# Patient Record
Sex: FEMALE | Race: WHITE | Employment: UNEMPLOYED | URBAN - NONMETROPOLITAN AREA
[De-identification: names, ages, dates, MRNs, and addresses within clinical notes are randomized per-mention and may not be internally consistent; named-entity substitution may affect disease eponyms.]

---

## 2020-11-05 ENCOUNTER — OFFICE VISIT (OUTPATIENT)
Dept: OBGYN CLINIC | Age: 25
End: 2020-11-05
Payer: MEDICARE

## 2020-11-05 VITALS
HEIGHT: 63 IN | DIASTOLIC BLOOD PRESSURE: 60 MMHG | BODY MASS INDEX: 40.22 KG/M2 | WEIGHT: 227 LBS | SYSTOLIC BLOOD PRESSURE: 108 MMHG

## 2020-11-05 PROCEDURE — 99213 OFFICE O/P EST LOW 20 MIN: CPT | Performed by: ADVANCED PRACTICE MIDWIFE

## 2020-11-05 RX ORDER — LAMOTRIGINE 150 MG/1
TABLET ORAL
COMMUNITY
Start: 2020-09-16

## 2020-11-05 RX ORDER — ESCITALOPRAM OXALATE 20 MG/1
TABLET ORAL
COMMUNITY
Start: 2020-09-21

## 2020-11-05 ASSESSMENT — ENCOUNTER SYMPTOMS: GASTROINTESTINAL NEGATIVE: 1

## 2020-11-05 NOTE — PROGRESS NOTES
PROBLEM VISIT     Date of service: 2020    Maria L Child  Is a 31 y.o.  female    PT's PCP is: No primary care provider on file. : 1995                                          Review of Systems   Constitutional: Negative. HENT: Negative. Gastrointestinal: Negative. Genitourinary: Positive for menstrual problem (Cycles every 28-34 days. TTC). Musculoskeletal: Negative. Neurological: Negative. Psychiatric/Behavioral:        Following closely with Dr Sarah Martinez         Patient's last menstrual period was 2020. OB History    Para Term  AB Living   4 3 2 1 1 3   SAB TAB Ectopic Molar Multiple Live Births   1       1 3      # Outcome Date GA Lbr Alen/2nd Weight Sex Delivery Anes PTL Lv   4A SAB 10/25/18 16w0d   M    ND      Birth Comments: D/C   4B SAB 10/25/18 16w0d   M    ND      Birth Comments: D/C   3 Term 10/19/15 37w0d   F CS-LTranv   FRANKIE      Complications: Pre-eclampsia   2  13 36w0d   M Vag-Spont   FRANKIE   1 Term 12 39w0d   M Vag-Spont EPI  FRANKIE        Social History     Tobacco Use   Smoking Status Never Smoker   Smokeless Tobacco Never Used        Social History     Substance and Sexual Activity   Alcohol Use Yes    Comment: rare       Allergies: Patient has no known allergies.       Current Outpatient Medications:     Prenatal Vit-Fe Fumarate-FA (PRENATAL VITAMIN PO), Take 1 tablet by mouth daily, Disp: , Rfl:     escitalopram (LEXAPRO) 20 MG tablet, TAKE 1 TABLET BY MOUTH ONCE DAILY, Disp: , Rfl:     lamoTRIgine (LAMICTAL) 150 MG tablet, TAKE 1 TABLET BY MOUTH ONCE DAILY, Disp: , Rfl:     metFORMIN (GLUCOPHAGE) 500 MG tablet, TAKE 1 TABLET BY MOUTH ONCE DAILY FOR 7 DAYS THEN 2 ONCE DAILY FOR 7 DAYS THEN 3 ONCE DAILY, Disp: , Rfl:     Social History     Substance and Sexual Activity   Sexual Activity Yes       Last Yearly:  3/2020    Last pap: 3/2020    Last HPV: reflex testing    Chief Complaint   Patient presents with   Linda Blevins

## 2020-11-11 ENCOUNTER — TELEPHONE (OUTPATIENT)
Dept: OBGYN CLINIC | Age: 25
End: 2020-11-11

## 2020-11-11 NOTE — TELEPHONE ENCOUNTER
Spoke to patient to remind her of ultrasound on 11/12/20 at 8:45, followed by appointment with Davis Quinones at 9:45.

## 2020-11-12 ENCOUNTER — OFFICE VISIT (OUTPATIENT)
Dept: OBGYN CLINIC | Age: 25
End: 2020-11-12
Payer: MEDICARE

## 2020-11-12 VITALS — WEIGHT: 226.8 LBS | DIASTOLIC BLOOD PRESSURE: 73 MMHG | SYSTOLIC BLOOD PRESSURE: 120 MMHG | BODY MASS INDEX: 40.18 KG/M2

## 2020-11-12 PROCEDURE — 99213 OFFICE O/P EST LOW 20 MIN: CPT | Performed by: NURSE PRACTITIONER

## 2020-11-12 NOTE — PROGRESS NOTES
PROBLEM VISIT     Date of service: 2020    Batool Tabor  Is a 31 y.o. female    PT's PCP is: No primary care provider on file. : 1995                                             Subjective:       Patient's last menstrual period was 2020. OB History    Para Term  AB Living   4 3 2 1 1 3   SAB TAB Ectopic Molar Multiple Live Births   1       1 3      # Outcome Date GA Lbr Alen/2nd Weight Sex Delivery Anes PTL Lv   4A SAB 10/25/18 16w0d   M    ND      Birth Comments: D/C   4B SAB 10/25/18 16w0d   M    ND      Birth Comments: D/C   3 Term 10/19/15 37w0d   F CS-LTranv   FRANKIE      Complications: Pre-eclampsia   2  13 36w0d   M Vag-Spont   FRANKIE   1 Term 12 39w0d   M Vag-Spont EPI  FRANKIE        Social History     Tobacco Use   Smoking Status Never Smoker   Smokeless Tobacco Never Used        Social History     Substance and Sexual Activity   Alcohol Use Yes    Comment: rare       Allergies: Patient has no known allergies. Current Outpatient Medications:     Prenatal Vit-Fe Fumarate-FA (PRENATAL VITAMIN PO), Take 1 tablet by mouth daily, Disp: , Rfl:     escitalopram (LEXAPRO) 20 MG tablet, TAKE 1 TABLET BY MOUTH ONCE DAILY, Disp: , Rfl:     lamoTRIgine (LAMICTAL) 150 MG tablet, TAKE 1 TABLET BY MOUTH ONCE DAILY, Disp: , Rfl:     metFORMIN (GLUCOPHAGE) 500 MG tablet, TAKE 1 TABLET BY MOUTH ONCE DAILY FOR 7 DAYS THEN 2 ONCE DAILY FOR 7 DAYS THEN 3 ONCE DAILY, Disp: , Rfl:     Social History     Substance and Sexual Activity   Sexual Activity Yes       Chief Complaint   Patient presents with    Follow-up     Follow up follicle scan        PE:  Vital Signs  Blood pressure 120/73, weight 226 lb 12.8 oz (102.9 kg), last menstrual period 2020. HPI: Patient here following mid cycle follicle scan. PT denies fever, chills, nausea and vomiting       Review of Systems   Constitutional: Negative. Genitourinary: Negative.         Physical Exam  Constitutional:       Appearance: Normal appearance. HENT:      Head: Normocephalic. Pulmonary:      Effort: Pulmonary effort is normal.   Musculoskeletal: Normal range of motion. Neurological:      Mental Status: She is alert. Psychiatric:         Mood and Affect: Mood normal.         Behavior: Behavior normal.         Assessment and Plan          Diagnosis Orders   1. Infertility associated with anovulation     2. Metabolic syndrome         Patient here following midcycle follicle scan, no dominant follicle this cycle. Patient planning for day 21 progesterone level. Patient is aware to call day 1 of cycle for Rx       I am having Balaji Allen maintain her Prenatal Vit-Fe Fumarate-FA (PRENATAL VITAMIN PO), escitalopram, lamoTRIgine, and metFORMIN. There are no Patient Instructions on file for this visit. Over 50% of time spent on counseling and care coordination on: see assessment and plan,  She was also counseled on her preventative health maintenance recommendations and follow-up.         FF time: 15 min      Jimmy Sands,11/12/2020 9:24 AM

## 2020-12-03 RX ORDER — LETROZOLE 2.5 MG/1
2.5 TABLET, FILM COATED ORAL DAILY
Qty: 5 TABLET | Refills: 0 | Status: SHIPPED | OUTPATIENT
Start: 2020-12-03

## 2025-07-18 ENCOUNTER — HOSPITAL ENCOUNTER (INPATIENT)
Age: 30
LOS: 4 days | Discharge: HOME | DRG: 751 | End: 2025-07-22
Attending: PSYCHIATRY & NEUROLOGY | Admitting: PSYCHIATRY & NEUROLOGY
Payer: MEDICAID

## 2025-07-18 PROBLEM — F33.2 SEVERE RECURRENT MAJOR DEPRESSION WITHOUT PSYCHOTIC FEATURES (HCC): Status: ACTIVE | Noted: 2025-07-18

## 2025-07-18 PROBLEM — F32.2 MDD (MAJOR DEPRESSIVE DISORDER), SINGLE EPISODE, SEVERE , NO PSYCHOSIS (HCC): Status: ACTIVE | Noted: 2025-07-18

## 2025-07-18 PROCEDURE — 1240000000 HC EMOTIONAL WELLNESS R&B

## 2025-07-18 PROCEDURE — 6370000000 HC RX 637 (ALT 250 FOR IP): Performed by: PSYCHIATRY & NEUROLOGY

## 2025-07-18 PROCEDURE — 6370000000 HC RX 637 (ALT 250 FOR IP): Performed by: PHYSICIAN ASSISTANT

## 2025-07-18 RX ORDER — DULAGLUTIDE 3 MG/.5ML
3 INJECTION, SOLUTION SUBCUTANEOUS WEEKLY
COMMUNITY
Start: 2025-07-08

## 2025-07-18 RX ORDER — VITAMIN B COMPLEX
5000 TABLET ORAL DAILY
Status: DISCONTINUED | OUTPATIENT
Start: 2025-07-18 | End: 2025-07-18

## 2025-07-18 RX ORDER — PRENATAL WITH FERROUS FUM AND FOLIC ACID 3080; 920; 120; 400; 22; 1.84; 3; 20; 10; 1; 12; 200; 27; 25; 2 [IU]/1; [IU]/1; MG/1; [IU]/1; MG/1; MG/1; MG/1; MG/1; MG/1; MG/1; UG/1; MG/1; MG/1; MG/1; MG/1
1 TABLET ORAL DAILY
Status: DISCONTINUED | OUTPATIENT
Start: 2025-07-18 | End: 2025-07-18

## 2025-07-18 RX ORDER — VENLAFAXINE HYDROCHLORIDE 37.5 MG/1
37.5 CAPSULE, EXTENDED RELEASE ORAL DAILY
Status: ON HOLD | COMMUNITY
Start: 2025-07-08 | End: 2025-07-21 | Stop reason: HOSPADM

## 2025-07-18 RX ORDER — HYDROXYZINE HYDROCHLORIDE 25 MG/1
50 TABLET, FILM COATED ORAL 3 TIMES DAILY PRN
Status: DISCONTINUED | OUTPATIENT
Start: 2025-07-18 | End: 2025-07-22 | Stop reason: HOSPADM

## 2025-07-18 RX ORDER — MESALAMINE 1.2 G/1
2.4 TABLET, DELAYED RELEASE ORAL EVERY EVENING
Status: DISCONTINUED | OUTPATIENT
Start: 2025-07-18 | End: 2025-07-22 | Stop reason: HOSPADM

## 2025-07-18 RX ORDER — MESALAMINE 0.38 G/1
4 CAPSULE, EXTENDED RELEASE ORAL NIGHTLY
Status: DISCONTINUED | OUTPATIENT
Start: 2025-07-18 | End: 2025-07-18 | Stop reason: SDUPTHER

## 2025-07-18 RX ORDER — VITAMIN B COMPLEX
5000 TABLET ORAL NIGHTLY
Status: DISCONTINUED | OUTPATIENT
Start: 2025-07-18 | End: 2025-07-22 | Stop reason: HOSPADM

## 2025-07-18 RX ORDER — PRENATAL WITH FERROUS FUM AND FOLIC ACID 3080; 920; 120; 400; 22; 1.84; 3; 20; 10; 1; 12; 200; 27; 25; 2 [IU]/1; [IU]/1; MG/1; [IU]/1; MG/1; MG/1; MG/1; MG/1; MG/1; MG/1; UG/1; MG/1; MG/1; MG/1; MG/1
1 TABLET ORAL NIGHTLY
Status: DISCONTINUED | OUTPATIENT
Start: 2025-07-18 | End: 2025-07-22 | Stop reason: HOSPADM

## 2025-07-18 RX ORDER — VENLAFAXINE HYDROCHLORIDE 150 MG/1
150 CAPSULE, EXTENDED RELEASE ORAL NIGHTLY
Status: DISCONTINUED | OUTPATIENT
Start: 2025-07-18 | End: 2025-07-22 | Stop reason: HOSPADM

## 2025-07-18 RX ORDER — MESALAMINE 0.38 G/1
4 CAPSULE, EXTENDED RELEASE ORAL DAILY
COMMUNITY
Start: 2025-07-03

## 2025-07-18 RX ORDER — TRAZODONE HYDROCHLORIDE 50 MG/1
50 TABLET ORAL NIGHTLY PRN
Status: DISCONTINUED | OUTPATIENT
Start: 2025-07-18 | End: 2025-07-22 | Stop reason: HOSPADM

## 2025-07-18 RX ORDER — DM/PE/ACETAMINOPHEN/CHLORPHENR 10-5-325-2
5000 TABLET, SEQUENTIAL ORAL DAILY
COMMUNITY
Start: 2025-07-03

## 2025-07-18 RX ORDER — PROPRANOLOL HCL 20 MG
20 TABLET ORAL 3 TIMES DAILY
Status: DISCONTINUED | OUTPATIENT
Start: 2025-07-18 | End: 2025-07-21

## 2025-07-18 RX ORDER — IBUPROFEN 400 MG/1
400 TABLET, FILM COATED ORAL EVERY 6 HOURS PRN
Status: DISCONTINUED | OUTPATIENT
Start: 2025-07-18 | End: 2025-07-21

## 2025-07-18 RX ORDER — ACETAMINOPHEN 325 MG/1
650 TABLET ORAL EVERY 4 HOURS PRN
Status: DISCONTINUED | OUTPATIENT
Start: 2025-07-18 | End: 2025-07-22 | Stop reason: HOSPADM

## 2025-07-18 RX ORDER — PANTOPRAZOLE SODIUM 40 MG/1
40 TABLET, DELAYED RELEASE ORAL
Status: DISCONTINUED | OUTPATIENT
Start: 2025-07-18 | End: 2025-07-22 | Stop reason: HOSPADM

## 2025-07-18 RX ORDER — HYDROXYZINE HYDROCHLORIDE 25 MG/1
25 TABLET, FILM COATED ORAL EVERY 6 HOURS PRN
Status: ON HOLD | COMMUNITY
Start: 2025-07-17 | End: 2025-07-21 | Stop reason: HOSPADM

## 2025-07-18 RX ORDER — OMEPRAZOLE 40 MG/1
40 CAPSULE, DELAYED RELEASE ORAL DAILY
COMMUNITY
Start: 2025-07-15

## 2025-07-18 RX ORDER — MAGNESIUM HYDROXIDE/ALUMINUM HYDROXICE/SIMETHICONE 120; 1200; 1200 MG/30ML; MG/30ML; MG/30ML
30 SUSPENSION ORAL EVERY 6 HOURS PRN
Status: DISCONTINUED | OUTPATIENT
Start: 2025-07-18 | End: 2025-07-22 | Stop reason: HOSPADM

## 2025-07-18 RX ORDER — METOPROLOL SUCCINATE 50 MG/1
50 TABLET, EXTENDED RELEASE ORAL NIGHTLY
Status: ON HOLD | COMMUNITY
Start: 2025-07-03 | End: 2025-07-21 | Stop reason: HOSPADM

## 2025-07-18 RX ORDER — VENLAFAXINE HYDROCHLORIDE 75 MG/1
75 CAPSULE, EXTENDED RELEASE ORAL DAILY
Status: ON HOLD | COMMUNITY
Start: 2025-07-03 | End: 2025-07-21 | Stop reason: HOSPADM

## 2025-07-18 RX ADMIN — PRENATAL WITH FERROUS FUM AND FOLIC ACID 1 TABLET: 3080; 920; 120; 400; 22; 1.84; 3; 20; 10; 1; 12; 200; 27; 25; 2 TABLET ORAL at 20:59

## 2025-07-18 RX ADMIN — MESALAMINE 2.4 G: 1.2 TABLET, DELAYED RELEASE ORAL at 21:00

## 2025-07-18 RX ADMIN — PROPRANOLOL HYDROCHLORIDE 20 MG: 20 TABLET ORAL at 20:59

## 2025-07-18 RX ADMIN — Medication 5000 UNITS: at 20:59

## 2025-07-18 RX ADMIN — Medication 3 MG: at 21:04

## 2025-07-18 RX ADMIN — PROPRANOLOL HYDROCHLORIDE 20 MG: 20 TABLET ORAL at 13:40

## 2025-07-18 RX ADMIN — HYDROXYZINE HYDROCHLORIDE 50 MG: 25 TABLET, FILM COATED ORAL at 11:59

## 2025-07-18 RX ADMIN — VENLAFAXINE HYDROCHLORIDE 150 MG: 150 CAPSULE, EXTENDED RELEASE ORAL at 20:59

## 2025-07-18 ASSESSMENT — PAIN - FUNCTIONAL ASSESSMENT: PAIN_FUNCTIONAL_ASSESSMENT: ACTIVITIES ARE NOT PREVENTED

## 2025-07-18 ASSESSMENT — PAIN SCALES - GENERAL
PAINLEVEL_OUTOF10: 2
PAINLEVEL_OUTOF10: 0

## 2025-07-18 ASSESSMENT — PAIN DESCRIPTION - DESCRIPTORS: DESCRIPTORS: ACHING

## 2025-07-18 ASSESSMENT — LIFESTYLE VARIABLES
HOW OFTEN DO YOU HAVE A DRINK CONTAINING ALCOHOL: PATIENT DECLINED
HOW MANY STANDARD DRINKS CONTAINING ALCOHOL DO YOU HAVE ON A TYPICAL DAY: PATIENT DECLINED

## 2025-07-18 ASSESSMENT — PAIN DESCRIPTION - LOCATION: LOCATION: OTHER (COMMENT)

## 2025-07-18 ASSESSMENT — PAIN DESCRIPTION - ONSET: ONSET: GRADUAL

## 2025-07-18 ASSESSMENT — PAIN DESCRIPTION - PAIN TYPE: TYPE: ACUTE PAIN

## 2025-07-18 ASSESSMENT — PAIN DESCRIPTION - FREQUENCY: FREQUENCY: INTERMITTENT

## 2025-07-18 NOTE — BH NOTE
INPATIENT RECREATIONAL THERAPY  ADULT BEHAVIORAL SERVICES  EVALUATION    REFERRING PHYSICIAN:  Carol Ann Garvey MD  DIAGNOSIS: MDD, single episode, severe, no psychosis   PRECAUTIONS:  standard  HISTORY OF PRESENT ILLNESS/INJURY:   a 30 y.o. female with significant past psychiatric history of depression and anxiety admitted from [facility] for worsening suicidal ideation and a plan to kill self.   PMH:  Please see medical chart for prior medical history, allergies, and medicatio    HISTORY OF PSYCHIATRIC TREATMENT:  Currently follows with - Dr Dowling- [facility]   multiple lifetime suicide attempts  multiple psychiatric hospital admissions  YOB: 1995  GENDER:  female  MARITAL STATUS:     EMPLOYMENT STATUS:   employed  LIVING SITUATION/SUPPORT:    EDUCATIONAL LEVEL:   MEDICATION/DRUG USE:    LEISURE INTERESTS:  coloring/watching movies/complete [employer] puzzles/jennifer  ACTIVITY PREFERENCE: no preference  ACTIVITY TYPES:  indoor/outdoor/active/passive  COGNITION: A&xO4      COPING: fair  ATTENTION: fair  RELAXATION: poor  SELF-ESTEEM: poor  MOTIVATION:  poor     SOCIAL SKILLS:  poor  FRUSTRATION TOLERANCE:  no documented hx of violent or aggressive behavior   ATTENTION SEEKING: no attention seeking behaviors observed at this time  COOPERATION: pt. Was pleasant and agreeable to the TR assessment   AFFECT: flat  APPEARANCE: pt. Displays fair grooming and hygiene and wears hospital scrubs     HEARING:  WNL  VISION:  WNL   VERBAL COMMUNICATION:  no impairment noted at this time  WRITTEN COMMUNICATION:  no impairment noted at this time     COORDINATION: no impairment noted at this time   MOBILITY: pt. Is able to ambulate independently on the unit            GOALS: .to increase socialization and knowledge of coping skills through participation in group therapy sessions following verbal encouragement from staff.

## 2025-07-18 NOTE — H&P
Department of Psychiatry  Attending Physician Psychiatric Assessment     Reason for Admission to Psychiatric Unit:  Threat to self requiring 24 hour professional observation  Concerns about patient's safety in the community    CHIEF COMPLAINT:  Suicidal ideation with an intent    History obtained from:  patient, electronic medical record and family members    HISTORY OF PRESENT ILLNESS:    Paulina Hauser is a 30 y.o. female with significant past psychiatric history of depression and anxiety admitted from [facility] for worsening suicidal ideation and a plan to kill self.  Patient reports that she has been feeling recently depressed for last several months now.  Identifies significant anxiety and panic attacks as a primary stressor.  Mentions that she was recently discharged from [facility] and [facility]  Mentions that she had an appointment with her outpatient psychiatrist following which she had a significant panic attack and felt like she would not get any better following which she came back to the hospital.  Mentions that she fell by overdosing and dying following which she was recommended admission to psychiatric hospital.  Continues to report feeling helpless and hopeless about her anxiety.  Reports trouble falling asleep and staying asleep reports very poor attention and concentration.  Appears somewhat and flat and withdrawn during the conversation today.  No concerns for any significant drug use.  Also identifies stressors as very poor support from any friends or family members.  Discussed with her about resuming Effexor and titrating to effect along with switching metoprolol to propranolol to help with her anxiety.  Patient voiced understanding.      PSYCHIATRIC HISTORY:  yes Depression, anxiety    Currently follows with - Dr Dowling- [facility]   multiple lifetime suicide attempts  multiple psychiatric hospital admissions    [facility]

## 2025-07-18 NOTE — PATIENT CARE CONFERENCE
Behavioral Health  Initial Interdisciplinary Treatment Plan NOTE    REVIEW DATE AND TIME: 7/18/2025 1320    PATIENT was IN TREATMENT TEAM.  See Multidisciplinary Treatment Team sheet for participants.    ADMISSION TYPE:        REASON FOR ADMISSION:         Estimated Length of Stay Update:  3 to 5 days  Estimated Discharge Date Update: 7/20/2025       Addictive Behavior:   Medical Problems:  Past Medical History:   Diagnosis Date    Anemia     Anxiety     Bipolar affective (HCC)     Depression     Hypertension     Insulin resistance     Obese     Polycystic ovaries     Seasonal allergies     Yeast infection        EDUCATION:   Learner Progress Toward Treatment Goals: Reviewed results and recommendations of this team, Reviewed group plan and strategies, Reviewed signs, symptoms and risk of self harm and violent behavior, and Reviewed goals and plan of care    Method: Small group    Outcome: Verbalized understanding and Demonstrated Understanding    PATIENT GOALS: \"get my anxiety under control\"    OQ PRIORITIES IDENTIFIED BY THE PATIENT ON ADMISSION: (These are the symptoms that the patient has identified that are impacting them the most at the time of admission based on their own input on the OQ.  These priorities are to be included in all of their care while admitted.)        Not completed at time of this clinician's assessment    PLAN/TREATMENT RECOMMENDATIONS UPDATE:   What is the most important thing we can help you with while you are here? See Above  Who is your support system? Friend and dog  Do you have follow-up providers? [facility]: [facility], [facility], and a   Do you have the ability to pay for your medications? Yes, [place] Medicaid  Where will you be residing when you leave the hospital? Pt is currently homeless in [place]  Will need a return to work slip or FMLA paper completion? denies      GOALS UPDATE:   Time frame for Short-Term Goals: Daily    BRAYAN May

## 2025-07-19 PROBLEM — F33.2 SEVERE EPISODE OF RECURRENT MAJOR DEPRESSIVE DISORDER, WITHOUT PSYCHOTIC FEATURES (HCC): Status: ACTIVE | Noted: 2025-07-19

## 2025-07-19 PROBLEM — F41.9 ANXIETY DISORDER: Status: ACTIVE | Noted: 2025-07-19

## 2025-07-19 PROCEDURE — APPSS30 APP SPLIT SHARED TIME 16-30 MINUTES: Performed by: NURSE PRACTITIONER

## 2025-07-19 PROCEDURE — 6370000000 HC RX 637 (ALT 250 FOR IP): Performed by: PHYSICIAN ASSISTANT

## 2025-07-19 PROCEDURE — 6370000000 HC RX 637 (ALT 250 FOR IP): Performed by: PSYCHIATRY & NEUROLOGY

## 2025-07-19 PROCEDURE — 1240000000 HC EMOTIONAL WELLNESS R&B

## 2025-07-19 RX ADMIN — HYDROXYZINE HYDROCHLORIDE 50 MG: 25 TABLET, FILM COATED ORAL at 08:00

## 2025-07-19 RX ADMIN — Medication 3 MG: at 21:20

## 2025-07-19 RX ADMIN — PROPRANOLOL HYDROCHLORIDE 20 MG: 20 TABLET ORAL at 08:00

## 2025-07-19 RX ADMIN — PANTOPRAZOLE SODIUM 40 MG: 40 TABLET, DELAYED RELEASE ORAL at 06:46

## 2025-07-19 RX ADMIN — VENLAFAXINE HYDROCHLORIDE 150 MG: 150 CAPSULE, EXTENDED RELEASE ORAL at 21:20

## 2025-07-19 RX ADMIN — HYDROXYZINE HYDROCHLORIDE 50 MG: 25 TABLET, FILM COATED ORAL at 17:31

## 2025-07-19 RX ADMIN — Medication 5000 UNITS: at 21:20

## 2025-07-19 RX ADMIN — MESALAMINE 2.4 G: 1.2 TABLET, DELAYED RELEASE ORAL at 17:26

## 2025-07-19 RX ADMIN — PROPRANOLOL HYDROCHLORIDE 20 MG: 20 TABLET ORAL at 21:20

## 2025-07-19 RX ADMIN — PROPRANOLOL HYDROCHLORIDE 20 MG: 20 TABLET ORAL at 14:00

## 2025-07-19 RX ADMIN — PRENATAL WITH FERROUS FUM AND FOLIC ACID 1 TABLET: 3080; 920; 120; 400; 22; 1.84; 3; 20; 10; 1; 12; 200; 27; 25; 2 TABLET ORAL at 21:20

## 2025-07-19 ASSESSMENT — PAIN DESCRIPTION - PAIN TYPE: TYPE: ACUTE PAIN

## 2025-07-19 ASSESSMENT — PAIN - FUNCTIONAL ASSESSMENT: PAIN_FUNCTIONAL_ASSESSMENT: ACTIVITIES ARE NOT PREVENTED

## 2025-07-19 ASSESSMENT — PAIN DESCRIPTION - DESCRIPTORS: DESCRIPTORS: ACHING

## 2025-07-19 ASSESSMENT — PAIN SCALES - GENERAL
PAINLEVEL_OUTOF10: 2
PAINLEVEL_OUTOF10: 0

## 2025-07-19 ASSESSMENT — PAIN DESCRIPTION - LOCATION: LOCATION: FOOT

## 2025-07-19 NOTE — BH NOTE
Goal Wrap-Up/Relaxation Group    Date: 7/18/2025  Start Time: 2000  End Time:  2020    Type of Group: Goal Wrap Up and Relaxation    States that goal today was: none    Goal for today was No goal set today    Patient Did not participate in group/activities      Signature: Lynn Pringle

## 2025-07-20 PROCEDURE — 6370000000 HC RX 637 (ALT 250 FOR IP): Performed by: PSYCHIATRY & NEUROLOGY

## 2025-07-20 PROCEDURE — 1240000000 HC EMOTIONAL WELLNESS R&B

## 2025-07-20 PROCEDURE — 6370000000 HC RX 637 (ALT 250 FOR IP): Performed by: PHYSICIAN ASSISTANT

## 2025-07-20 PROCEDURE — APPSS30 APP SPLIT SHARED TIME 16-30 MINUTES: Performed by: NURSE PRACTITIONER

## 2025-07-20 RX ADMIN — PROPRANOLOL HYDROCHLORIDE 20 MG: 20 TABLET ORAL at 21:37

## 2025-07-20 RX ADMIN — PRENATAL WITH FERROUS FUM AND FOLIC ACID 1 TABLET: 3080; 920; 120; 400; 22; 1.84; 3; 20; 10; 1; 12; 200; 27; 25; 2 TABLET ORAL at 21:37

## 2025-07-20 RX ADMIN — MESALAMINE 2.4 G: 1.2 TABLET, DELAYED RELEASE ORAL at 17:19

## 2025-07-20 RX ADMIN — Medication 5000 UNITS: at 21:37

## 2025-07-20 RX ADMIN — PROPRANOLOL HYDROCHLORIDE 20 MG: 20 TABLET ORAL at 14:29

## 2025-07-20 RX ADMIN — PANTOPRAZOLE SODIUM 40 MG: 40 TABLET, DELAYED RELEASE ORAL at 06:53

## 2025-07-20 RX ADMIN — IBUPROFEN 400 MG: 400 TABLET ORAL at 20:04

## 2025-07-20 RX ADMIN — PROPRANOLOL HYDROCHLORIDE 20 MG: 20 TABLET ORAL at 09:16

## 2025-07-20 RX ADMIN — HYDROXYZINE HYDROCHLORIDE 50 MG: 25 TABLET, FILM COATED ORAL at 18:08

## 2025-07-20 RX ADMIN — HYDROXYZINE HYDROCHLORIDE 50 MG: 25 TABLET, FILM COATED ORAL at 09:16

## 2025-07-20 RX ADMIN — Medication 3 MG: at 21:37

## 2025-07-20 ASSESSMENT — PAIN SCALES - GENERAL
PAINLEVEL_OUTOF10: 0
PAINLEVEL_OUTOF10: 3
PAINLEVEL_OUTOF10: 3

## 2025-07-20 ASSESSMENT — PAIN - FUNCTIONAL ASSESSMENT: PAIN_FUNCTIONAL_ASSESSMENT: PREVENTS OR INTERFERES SOME ACTIVE ACTIVITIES AND ADLS

## 2025-07-20 ASSESSMENT — PAIN DESCRIPTION - LOCATION: LOCATION: FOOT

## 2025-07-20 ASSESSMENT — PAIN DESCRIPTION - ORIENTATION: ORIENTATION: RIGHT

## 2025-07-20 ASSESSMENT — PAIN DESCRIPTION - DESCRIPTORS: DESCRIPTORS: ACHING

## 2025-07-20 NOTE — BH NOTE
Goal Wrap-Up/Relaxation Group    Date: 7/19/2025  Start Time: 2000  End Time:  2020    Type of Group: Relaxation      Patient Did not participate in group/activities      Signature: Netta Barraza RN

## 2025-07-20 NOTE — PATIENT CARE CONFERENCE
Behavioral Health   Day 3 Interdisciplinary Treatment Plan NOTE    Review Date & Time: 7/20/2025 8:38    Patient was in treatment team    Admission Type:   Admission Type: Involuntary    Reason for admission:  Reason for Admission: JUNIOR. Patient refused admission assessment due to \"sensory perception issues\".  Estimated Length of Stay Update:  1 to 2 days  Estimated Discharge Date Update: 7/24/2025       Addictive Behavior:   Medical Problems:  Past Medical History:   Diagnosis Date    Anemia     Anxiety     Bipolar affective (HCC)     Depression     Hypertension     Insulin resistance     Obese     Polycystic ovaries     Seasonal allergies     Yeast infection        Risk:  Fall Risk   Kaden Scale Kaden Scale Score: 21    Status EXAM:   Mental Status and Behavioral Exam  Normal: No  Level of Assistance: Independent/Self  Facial Expression: Flat  Affect: Blunt  Level of Consciousness: Alert  Frequency of Checks: 4 times per hour, close  Mood:Normal: No  Mood: Anxious  Motor Activity:Normal: No  Motor Activity: Decreased  Eye Contact: Poor  Observed Behavior: Withdrawn, Cooperative, Friendly  Sexual Misconduct History: Current - no  Preception: [place] to person, [place] to time, [place] to place, [place] to situation  Attention:Normal: No  Attention: Unable to concentrate  Thought Processes: Circumstantial  Thought Content:Normal: No  Thought Content: Paranoia, Ideas of influence  Depression Symptoms: No problems reported or observed.  Anxiety Symptoms: Generalized  Natalie Symptoms: No problems reported or observed.  Hallucinations: None  Delusions: No  Memory:Normal: No  Memory: Confabulation  Insight and Judgment: No  Insight and Judgment: Poor judgment, Poor insight, Unmotivated, Unrealistic    Daily Assessment Last Entry:   Daily Sleep (WDL): Within Defined Limits            Daily Nutrition (WDL): Within Defined Limits  Level of Assistance: Independent/Self    Patient Monitoring:  Frequency of Checks: 4 times per

## 2025-07-21 ENCOUNTER — APPOINTMENT (OUTPATIENT)
Dept: GENERAL RADIOLOGY | Age: 30
DRG: 751 | End: 2025-07-21
Attending: PSYCHIATRY & NEUROLOGY
Payer: MEDICAID

## 2025-07-21 PROBLEM — F32.2 MDD (MAJOR DEPRESSIVE DISORDER), SINGLE EPISODE, SEVERE , NO PSYCHOSIS (HCC): Status: RESOLVED | Noted: 2025-07-18 | Resolved: 2025-07-21

## 2025-07-21 PROCEDURE — 6370000000 HC RX 637 (ALT 250 FOR IP): Performed by: PSYCHIATRY & NEUROLOGY

## 2025-07-21 PROCEDURE — APPSS30 APP SPLIT SHARED TIME 16-30 MINUTES: Performed by: PHYSICIAN ASSISTANT

## 2025-07-21 PROCEDURE — 6370000000 HC RX 637 (ALT 250 FOR IP): Performed by: PHYSICIAN ASSISTANT

## 2025-07-21 PROCEDURE — 1240000000 HC EMOTIONAL WELLNESS R&B

## 2025-07-21 PROCEDURE — 73630 X-RAY EXAM OF FOOT: CPT

## 2025-07-21 RX ORDER — VENLAFAXINE HYDROCHLORIDE 150 MG/1
150 CAPSULE, EXTENDED RELEASE ORAL NIGHTLY
Qty: 30 CAPSULE | Refills: 0 | Status: SHIPPED | OUTPATIENT
Start: 2025-07-21 | End: 2025-07-22

## 2025-07-21 RX ORDER — PROPRANOLOL HYDROCHLORIDE 10 MG/1
30 TABLET ORAL 3 TIMES DAILY
Qty: 90 TABLET | Refills: 2 | Status: SHIPPED | OUTPATIENT
Start: 2025-07-21 | End: 2025-07-22

## 2025-07-21 RX ORDER — HYDROXYZINE HYDROCHLORIDE 50 MG/1
50 TABLET, FILM COATED ORAL 3 TIMES DAILY PRN
Qty: 60 TABLET | Refills: 0 | Status: SHIPPED | OUTPATIENT
Start: 2025-07-21 | End: 2025-07-22

## 2025-07-21 RX ORDER — IBUPROFEN 800 MG/1
800 TABLET, FILM COATED ORAL EVERY 8 HOURS PRN
Status: DISCONTINUED | OUTPATIENT
Start: 2025-07-21 | End: 2025-07-22 | Stop reason: HOSPADM

## 2025-07-21 RX ADMIN — PROPRANOLOL HYDROCHLORIDE 20 MG: 20 TABLET ORAL at 10:00

## 2025-07-21 RX ADMIN — PROPRANOLOL HYDROCHLORIDE 30 MG: 20 TABLET ORAL at 21:06

## 2025-07-21 RX ADMIN — PROPRANOLOL HYDROCHLORIDE 30 MG: 20 TABLET ORAL at 15:07

## 2025-07-21 RX ADMIN — MESALAMINE 2.4 G: 1.2 TABLET, DELAYED RELEASE ORAL at 21:05

## 2025-07-21 RX ADMIN — PANTOPRAZOLE SODIUM 40 MG: 40 TABLET, DELAYED RELEASE ORAL at 06:47

## 2025-07-21 RX ADMIN — PRENATAL WITH FERROUS FUM AND FOLIC ACID 1 TABLET: 3080; 920; 120; 400; 22; 1.84; 3; 20; 10; 1; 12; 200; 27; 25; 2 TABLET ORAL at 21:06

## 2025-07-21 RX ADMIN — IBUPROFEN 800 MG: 800 TABLET, FILM COATED ORAL at 21:07

## 2025-07-21 RX ADMIN — Medication 3 MG: at 21:09

## 2025-07-21 RX ADMIN — Medication 5000 UNITS: at 21:05

## 2025-07-21 RX ADMIN — VENLAFAXINE HYDROCHLORIDE 150 MG: 150 CAPSULE, EXTENDED RELEASE ORAL at 21:06

## 2025-07-21 ASSESSMENT — PAIN SCALES - GENERAL
PAINLEVEL_OUTOF10: 4
PAINLEVEL_OUTOF10: 4
PAINLEVEL_OUTOF10: 7

## 2025-07-21 ASSESSMENT — PAIN DESCRIPTION - LOCATION
LOCATION: FOOT
LOCATION: FOOT

## 2025-07-21 ASSESSMENT — PAIN DESCRIPTION - ORIENTATION: ORIENTATION: RIGHT

## 2025-07-21 ASSESSMENT — PAIN DESCRIPTION - FREQUENCY: FREQUENCY: INTERMITTENT

## 2025-07-21 ASSESSMENT — PAIN DESCRIPTION - DESCRIPTORS
DESCRIPTORS: CRAMPING;ACHING
DESCRIPTORS: ACHING

## 2025-07-21 ASSESSMENT — PAIN DESCRIPTION - PAIN TYPE: TYPE: ACUTE PAIN

## 2025-07-21 ASSESSMENT — PAIN - FUNCTIONAL ASSESSMENT: PAIN_FUNCTIONAL_ASSESSMENT: PREVENTS OR INTERFERES SOME ACTIVE ACTIVITIES AND ADLS

## 2025-07-21 NOTE — BH NOTE
Goal Wrap-Up/Relaxation Group    Date: 7/20/25  Start Time: 2000  End Time:  2020    Type of Group: Relaxation        Patient Did not participate in group/activities      Signature: Netta Barraza RN

## 2025-07-21 NOTE — GROUP NOTE
Group Therapy Note    Date: 7/21/2025    Group Start Time: 1330  Group End Time: 1400  Group Topic: Healthy Living/Wellness    [facility] Adult Psych 7E    Anitha Prater LPN        Group Therapy Note    Attendees: 10       Notes:  did not attend    Discipline Responsible: Licensed Practical Nurse      Signature:  Anitha Prater LPN

## 2025-07-22 VITALS
RESPIRATION RATE: 18 BRPM | OXYGEN SATURATION: 100 % | SYSTOLIC BLOOD PRESSURE: 114 MMHG | TEMPERATURE: 96.9 F | DIASTOLIC BLOOD PRESSURE: 79 MMHG | HEART RATE: 79 BPM

## 2025-07-22 PROCEDURE — 6370000000 HC RX 637 (ALT 250 FOR IP): Performed by: PSYCHIATRY & NEUROLOGY

## 2025-07-22 PROCEDURE — 6370000000 HC RX 637 (ALT 250 FOR IP): Performed by: PHYSICIAN ASSISTANT

## 2025-07-22 RX ORDER — PROPRANOLOL HYDROCHLORIDE 10 MG/1
30 TABLET ORAL 3 TIMES DAILY
Qty: 90 TABLET | Refills: 2 | Status: SHIPPED | OUTPATIENT
Start: 2025-07-22

## 2025-07-22 RX ORDER — VENLAFAXINE HYDROCHLORIDE 150 MG/1
150 CAPSULE, EXTENDED RELEASE ORAL NIGHTLY
Qty: 30 CAPSULE | Refills: 0 | Status: SHIPPED | OUTPATIENT
Start: 2025-07-22

## 2025-07-22 RX ORDER — HYDROXYZINE HYDROCHLORIDE 50 MG/1
50 TABLET, FILM COATED ORAL 3 TIMES DAILY PRN
Qty: 60 TABLET | Refills: 0 | Status: SHIPPED | OUTPATIENT
Start: 2025-07-22 | End: 2025-08-11

## 2025-07-22 RX ADMIN — HYDROXYZINE HYDROCHLORIDE 50 MG: 25 TABLET, FILM COATED ORAL at 08:08

## 2025-07-22 RX ADMIN — PANTOPRAZOLE SODIUM 40 MG: 40 TABLET, DELAYED RELEASE ORAL at 06:43

## 2025-07-22 RX ADMIN — PROPRANOLOL HYDROCHLORIDE 30 MG: 20 TABLET ORAL at 08:08

## 2025-07-22 ASSESSMENT — PAIN DESCRIPTION - PAIN TYPE: TYPE: ACUTE PAIN

## 2025-07-22 ASSESSMENT — PAIN DESCRIPTION - LOCATION: LOCATION: FOOT

## 2025-07-22 ASSESSMENT — PAIN DESCRIPTION - ORIENTATION: ORIENTATION: RIGHT

## 2025-07-22 ASSESSMENT — PAIN - FUNCTIONAL ASSESSMENT: PAIN_FUNCTIONAL_ASSESSMENT: PREVENTS OR INTERFERES SOME ACTIVE ACTIVITIES AND ADLS

## 2025-07-22 ASSESSMENT — PAIN SCALES - GENERAL: PAINLEVEL_OUTOF10: 4

## 2025-07-22 ASSESSMENT — PAIN DESCRIPTION - FREQUENCY: FREQUENCY: INTERMITTENT

## 2025-07-22 ASSESSMENT — PAIN DESCRIPTION - DESCRIPTORS: DESCRIPTORS: ACHING

## 2025-07-22 NOTE — DISCHARGE INSTR - DIET

## 2025-07-22 NOTE — DISCHARGE INSTRUCTIONS
[facility] Hotline:  [phone]    Crisis phone numbers:  [facility], and [facility] [phone].  [facility], and [facility] [phone]  [facility] [place] [phone].  [place] and [facility] [phone].  [facility] [phone].  [facility], [facility] [phone].    [facility]  [address], [place] [address]  [phone]    [facility] [place] [facility]  [address] [facility]  [address] [place], [place] [address]  [place], [place] [id number] [phone]    [facility]  [address] [address] [address] [facility] [place] [address]  [phone]    [facility]  Rec[facility]  [address] [place] [address]  [phone]    [facility]  [address] [place] [address]  [phone]    [facility]  [address], [place] [id number] [phone]    [facility]  [address] [place] [address]  [phone]    [facility]  [address] [place], [place] [id number] [phone]    [facility]  [address] [address]  [phone]    [address] [address]  [phone]    [facility]  [address] [place], [place] [address] [phone]    [facility]  [address] [place] [address]  [phone]    [facility]  [address] [place], [place] [address] [phone]

## 2025-07-22 NOTE — DISCHARGE SUMMARY
Provider Discharge Summary     Patient ID:  Paulina Hauser  [id number]  30 y.o.  1995    Admit date: 7/18/2025    Discharge date and time: 7/22/2025  4:06 PM     Admitting Physician: Carol Ann Garvey MD     Discharge Physician: Carol Ann Garvey MD    Admission Diagnoses: Major depressive disorder [F32.9]  MDD (major depressive disorder), single episode, severe , no psychosis (HCC) [F32.2]    Discharge Diagnoses:      Severe episode of recurrent major depressive disorder, without psychotic features (HCC)     Patient Active Problem List   Diagnosis Code    Severe recurrent major depression without psychotic features (HCC) F33.2    Anxiety disorder F41.9    Severe episode of recurrent major depressive disorder, without psychotic features (HCC) F33.2        Admission Condition: poor    Discharged Condition: stable    Indication for Admission: threat to self    History of Present Illnes (present tense wording is of findings from admission exam and are not necessarily indicative of current findings):   Paulina Hauser is a 30 y.o. female with significant past psychiatric history of depression and anxiety admitted from [facility] for worsening suicidal ideation and a plan to kill self.  Patient reports that she has been feeling recently depressed for last several months now.  Identifies significant anxiety and panic attacks as a primary stressor.  Mentions that she was recently discharged from [facility] and [facility].  Mentions that she had an appointment with her outpatient psychiatrist following which she had a significant panic attack and felt like she would not get any better following which she came back to the hospital.  Mentions that she fell by overdosing and dying following which she was recommended admission to psychiatric hospital.  Continues to report feeling helpless and hopeless about her anxiety.  Reports trouble falling asleep and staying

## 2025-07-22 NOTE — TRANSITION OF CARE
24 hr chart review completed    
Directive? No  If the patient does not have a surrogate or Medical Advance Directive AND Psychiatric Advance Directive, the patient was offered information on these advance directives Patient declined to complete    Patient Instructions: Please continue all medications until otherwise directed by physician.      Tobacco Cessation Discharge Plan:   Is the patient a tobacco user  and needs referral for tobacco cessation? No  Patient referred to the following for tobacco cessation with an appointment? No  Patient was offered medication to assist with tobacco cessation at discharge? No    Alcohol/Substance Abuse Discharge Plan:   Does the patient have a history of substance/alcohol abuse and requires a referral for treatment? No  Patient referred to the following for substance/alcohol abuse treatment with an appointment? No  Patient was offered medication to assist with substance/alcohol abuse cessation at discharge? No      Patient discharged to: Inpatient facility; Transition record discussed with patient/caregiver and provided this record in hard copy or electronically

## 2025-07-22 NOTE — PROGRESS NOTES
BH Psychosocial Assessment    Current Level of Psychosocial Functioning     Independent XXX  Dependent    Minimal Assist     Comments:      Psychosocial High Risk Factors (check all that apply)    Unable to obtain meds   Chronic illness/pain    Substance abuse   Lack of Family Support XXX  Financial stress XXX  Isolation XXX  Inadequate Community Resources  Suicide attempt(s) XXX  Not taking medications   Victim of crime   Developmental Delay  Unable to manage personal needs    Age 65 or older   Homeless XXX  No transportation   Readmission within 30 days  Unemployment XXX  Traumatic Event    Family/Supports identified: friend and dog    Sexual Orientation:  heterosexual    Patient Personal Strengths: knowledgeable of area resources    Patient Protective Factors: has an interview to apply for SSI, identifies that she wants to be a better mother for her children    Patient Risk Factors: history of suicide attempts with the most recent being a couple weeks ago, homeless, unemployed, limited support system    Safety plan:  Discussed the safety planning process with the patient.    CMHC/MH history: Patient endorses that she was admitted to a mental health facility a couple weeks ago following a suicide attempt.    Plan of Care:  medication management, group/individual therapies, family meetings, psycho -education, treatment team meetings to assist with stabilization    Initial Discharge Plan:  Patient to be discharged to a homeless shelter in the [facility] with follow up at [facility]     Clinical Summary:  Paulina Hauser is a 30 year old female who was involuntarily admitted to the unit from [facility] due to \"anxiety\". Patient reports that her anxiety is triggered by people, places, and things. Patient reports that the \"public and leaving the house\" make her anxious. Patient stated that she is \"not really\" having suicidal 
      Behavioral Services  Medicare Certification Upon Admission    I certify that this patient's inpatient psychiatric hospital admission is medically necessary for:    [x] (1) Treatment which could reasonably be expected to improve this patient's condition,       [x] (2) Or for diagnostic study;     AND     [x](2) The inpatient psychiatric services are provided while the individual is under the care of a physician and are included in the individualized plan of care.    Estimated length of stay/service 3-5 days    Plan for post-hospital care hc    Electronically signed by Carol Ann Garvey MD on 7/18/2025 at 9:51 AM      
     Psychiatry Progress Note                                                  7-     CC: Suicidal ideation with intent                                                                           Subjective     Progress:  Paulina is seen bedside as ststes she doesn't want to come out of her room as their are to many people on the unit. .States she doesn't want to go to groups. States she is \"regressing\" due to so many people being on the unit. Reports still having depression and anxiety.  Denies feelings of harm towards self or others currently. Reports medication is still  helping \"a little\" Denies having side effects. Good med compliance is verified. Reports appetite and sleep are \"alright\" Verified slept 8.5  hours continuous last night. Denies getting any visits or talking to anyone on phone.      Objective  BP (!) 102/52   Pulse 87   Temp 98.4 °F (36.9 °C) (Temporal)   Resp 16   LMP  (LMP Unknown) Comment: JUNIOR. Patient refused admission assessment due to \"sensory perception issues\".  SpO2 99%       MSE:  Level of consciousness: Alert  Appearance: hospital attire, fair grooming   Behavior/Motor: no abnormalities noted   Attitude toward examiner: cooperative   Speech: Normal volume, goal directed, NRR  Mood: Dysthymic   Affect: Reactive  Thought processes: Linear and goal directed   Suicidal Ideation: Denies suicidal ideations  Homicidal ideation: Denies homicidal ideations  Delusions: No evidence of delusions is observed  Perceptual Disturbance: Denies AH/VH  Cognition: Oriented to person, place, time and situation   Concentration fair   Memory intact   Insight: Limited  Judgment: Limited     Assessment:  Major Depressive D/O recurrent severe without psychosis   Anxiety unspecified      Plan:  Continue current medications  Continue to encourage group participation   Attempt to develop insight  Psycho-education conducted.  Supportive Therapy conducted.  Probable discharge is TBD     Atilio Palmer, 
   Psychiatry Progress Note      7-    CC: Suicidal ideation with intent                    Subjective    Progress:  Paulina reports improving some. Denies really feeling depressed. States she has anxiety. Denies feelings of harm towards self or others currently. Reports medication is helping \"a little\" Denies having side effects. Good med compliance is verified. Reports appetite and sleep are improving. Verified slept 8 hours continuous last night. Denies going to groups yest but plans to attend groups today. Denies getting any visits or talking to anyone on phone.     Objective  BP (!) 102/52   Pulse 87   Temp 98.4 °F (36.9 °C) (Temporal)   Resp 16   LMP  (LMP Unknown) Comment: JUNIOR. Patient refused admission assessment due to \"sensory perception issues\".  SpO2 99%      MSE:  Level of consciousness: Alert  Appearance: hospital attire, in chair and fair grooming   Behavior/Motor: no abnormalities noted   Attitude toward examiner: cooperative   Speech: Normal volume, goal directed, NRR  Mood: Dysthymic   Affect: Reactive  Thought processes: Linear and goal directed   Suicidal Ideation: Denies suicidal ideations  Homicidal ideation: Denies homicidal ideations  Delusions: No evidence of delusions is observed  Perceptual Disturbance: Denies AH/VH  Cognition: Oriented to person, place, time and situation   Concentration fair   Memory intact   Insight: Limited  Judgment: Limited    Assessment:  Major Depressive D/O recurrent severe without psychosis   Anxiety unspecified     Plan:  Continue current medications  Continue to encourage group participation   Attempt to develop insight  Psycho-education conducted.  Supportive Therapy conducted.  Probable discharge is TBD     Atilio Palmer, CNP  7-                                         Psychiatry Attending Attestation     I assessed this patient and reviewed the case and plan of care with Advanced Practice Provider.  I have reviewed the above 
Behavioral Health   Discharge Note    Pt discharged with followings belongings:   Dental Appliances: None  Vision - Corrective Lenses: None  Hearing Aid: None  Jewelry: None  Body Piercings Removed: N/A  Clothing: Footwear, Shirt, Shorts  Other Valuables: Other (Comment) (Book and blanket)   Valuables retrieved from safe, security envelope number:  N/A and returned to patient.  Patient left department with staff via ambulatory.  Discharged to [facility]. \"An Important Message from Medicare About Your Rights\" (IMM) form photocopy original from admission and provided to pt at least 4 hours prior to discharge N/A. \"An Important Message from [employer] About Your Rights\" (IMM) form photocopy original from admission. N/A. If pt left within 4 hours of receiving 2nd delivery of IMM, this is because pt was agreeable with hospital discharge.  Patient/guardian education on aftercare instructions: Yes  Bridge appointment completed:  yes.  Reviewed Discharge Instructions with patient/family/nursing facility.  Patient/family verbalizes understanding and agreement with the discharge plan using the teachback method.   Patient/family verbalize understanding of AVS:Yes    Status EXAM upon discharge:  Mental Status and Behavioral Exam  Normal: No  Level of Assistance: Independent/Self  Facial Expression: Flat  Affect: Blunt  Level of Consciousness: Alert  Frequency of Checks: 4 times per hour, close  Mood:Normal: No  Mood: Anxious  Motor Activity:Normal: No  Motor Activity: Decreased  Eye Contact: Good  Observed Behavior: Cooperative  Sexual Misconduct History: Current - no  Preception: [place] to person, [place] to time, [place] to place, [place] to situation  Attention:Normal: Yes  Attention: Unable to concentrate  Thought Processes: Unremarkable  Thought Content:Normal: Yes  Thought Content: Paranoia, Ideas of influence  Depression Symptoms: No problems reported or observed.  Anxiety Symptoms: Generalized  Natalie Symptoms: No 
Case Management-Call to [facility] at [phone], spoke with Madeline. Madeline stated that the patient is allowed to return, however they first need progress notes for continuation of care. Madeline requested progress notes be faxed to [phone]. Madeline states that the patient needs to arrive to [facility] no later than 2:00 pm. This clinician faxed pt H&P and progress notes to [facility] at [phone].  
Department of Psychiatry  Progress Note     Chief Complaint:  Suicidal ideation with an intent     PROGRESS:  Paulina was seen in her room.  She was receptive to interaction and cooperative with the interview  She reported that she was doing okay today  She reported that she has been dealing with anxiety and anger.  She stated that she has been dealing with anger because she is in pain.  She reported that she has been experiencing a lot of pain and some swelling in her right foot.  She said she cannot walk on it due to the pain.  She denied injuring it.  She just said \"I shake my leg a lot.\"  She reported that the pain kept her up last night.  She also reported that she has been feeling anxious on the unit.  She said that there are a lot of people on the unit which makes her anxious.  She has been isolative to her room because of this.  She stated that she feels that she is \"regressing\" because of this  She denied any active suicidal thoughts today.  She contracted for safety.  She reported that she still feels hopeless and helpless at times but feels that it will be better when she goes back to [facility] in [place].  She said it would be \"great\" if she could go back there  Staff reported that she slept 6.5 hours broken last night  Appetite has been good  She has been compliant with her medications.  She denied any side effects  She has been isolative to her room and has not been attending groups    Xray of her right foot revealed \"Small Achilles spur. Tiny plantar calcaneal spur. Minimal hypertrophic  spurring along the proximal/dorsal aspect of the scaphoid bone. Minimal spurring  anterior aspect distal tibia\"     Suicidal ideations: denies    Compliance with medications: good   Medication side effects: absent  ROS: Patient has new complaints: Right foot pain  Sleep quality: 6.5 hours broken last night per staff  Attending groups: no      OBJECTIVE      Medications  Current Facility-Administered Medications: 
Psychotherapy Group 0930-Pt encouraged to attend group. Pt did not attend group.  
Psychotherapy Group 0930-Pt provided maximum encouragement to attend group. Pt did not attend group.   
Psychotherapy Group 0945-Pt provided maximum encouragement to attend group. Pt did not attend group.   
Psychotherapy group 0900-Pt provided maximum encouragement to attend group. Pt did not attend group.  
None  Jewelry: None  Body Piercings Removed: N/A  Clothing: Footwear, Shirt, Shorts  Other Valuables: Other (Comment) (Book and blanket)     Admission order obtained Yes  Belongings sent home withNA.   Valuables placed in safe in security envelope, number:  NA. Patient's home medications were MA.    Patient oriented to surroundings and program expectations and copy of patient rights given.   Received admission packet:  Refused    Consents reviewed, signed Refused.   Outcomes Questionnaire completed {Responses; yes/refused/notapproropriate:Refused.   If \"Refused\" or \"Not appropriate\" provide additional details. Refused      If Questionnaire is completed just type \"NA\". Refused    \"An Important Message from Medicare About Your Rights\" form reviewed, signed: Refused .    \"An Important Message from [employer] About Your Rights\" form reviewed, signed: N/A    Patient verbalize understanding: No.    Patient informed of 15 minute safety monitoring: YES/NO/NA: Refused    You have the right to have an advocate or support person of your choice accompany you during your stay, as outlined by the No Patient Left Alone Act. Please specify the individual you wish to designate for this admission: JUNIOR. Patient refused admission assessment due to \"sensory percipetion issues\".     Patient screened positive for suicide risk on CSSR-S (\"yes\" to question #4, 5, OR 6)  JUNIOR. Patient refused admission assessment due to \"sensory percipetion issues\". .    Physician notified of risk score JUNIOR. Patient refused admission assessment due to \"sensory percipetion issues\".     Constant Observer Orders received N/A .   2 person skin assessment completed upon admission JUNIOR. Patient refused admission assessment due to \"sensory percipetion issues\". .    Explained patients right to have family, representative or physician notified of their admission.    Patient has Declined for physician to be notified.    Patient has Declined for

## 2025-07-22 NOTE — PLAN OF CARE
Problem: Coping  Goal: Pt/Family able to verbalize concerns and demonstrate effective coping strategies  Description: INTERVENTIONS:  1. Assist patient/family to identify coping skills, available support systems and cultural and spiritual values  2. Provide emotional support, including active listening and acknowledgement of concerns of patient and caregivers  3. Reduce environmental stimuli, as able  4. Instruct patient/family in relaxation techniques, as appropriate  5. Assess for spiritual pain/suffering and initiate Spiritual Care, Psychosocial Clinical Specialist consults as needed  Outcome: Not Progressing  Flowsheets (Taken 7/18/2025 1536)  Patient/family able to verbalize anxieties, fears, and concerns, and demonstrate effective coping:   Provide emotional support, including active listening and acknowledgement of concerns of patient and caregivers   Assist patient/family to identify coping skills, available support systems and cultural and spiritual values  Note: Pt has attended 0/3 group therapy sessions offered thus far today. Pt has been given maximum verbal encouragement to attend group therapy sessions, pt has been isolative to his room the majority of the day. Intermittently, pt is seen out on the unit interacting with others. Plan to continue to monitor progress and encourage participation in group therapy programming.       
  Problem: Coping  Goal: Pt/Family able to verbalize concerns and demonstrate effective coping strategies  Description: INTERVENTIONS:  1. Assist patient/family to identify coping skills, available support systems and cultural and spiritual values  2. Provide emotional support, including active listening and acknowledgement of concerns of patient and caregivers  3. Reduce environmental stimuli, as able  4. Instruct patient/family in relaxation techniques, as appropriate  5. Assess for spiritual pain/suffering and initiate Spiritual Care, Psychosocial Clinical Specialist consults as needed  Outcome: Not Progressing  Flowsheets (Taken 7/21/2025 1501)  Patient/family able to verbalize anxieties, fears, and concerns, and demonstrate effective coping:   Assist patient/family to identify coping skills, available support systems and cultural and spiritual values   Provide emotional support, including active listening and acknowledgement of concerns of patient and caregivers  Note: Pt has attended 0/4 group therapy sessions offered thus far today. Pt has been given maximum verbal encouragement to attend group therapy sessions, pt has been isolative to his room the majority of the day. Intermittently, pt is seen out on the unit interacting with others. Plan to continue to monitor progress and encourage participation in group therapy programming.       
  Problem: Risk for Elopement  Goal: Patient will not exit the unit/facility without proper excort  7/21/2025 2356 by Netta Barraza, RN  Outcome: Progressing  Flowsheets (Taken 7/18/2025 1404 by Yuly De Santiago, RN)  Nursing Interventions for Elopement Risk: Reduce environmental triggers  Note: Patient has had no elopement attempts since admitting to the unit.   7/21/2025 1902 by Gail Hathaway LPN  Outcome: Progressing  7/21/2025 1850 by Gail Hathaway LPN  Outcome: Progressing     Problem: Discharge Planning  Goal: Discharge to home or other facility with appropriate resources  7/21/2025 2356 by Netta Barraza, RN  Outcome: Progressing  Flowsheets (Taken 7/19/2025 0056)  Discharge to home or other facility with appropriate resources: Identify barriers to discharge with patient and caregiver  Note: Patient is able to identify barriers to discharge.   7/21/2025 1902 by Gail Hathaway LPN  Note: Patient plans to be discharged to private resident with family. Discharge planner working with patient to achieve optimal discharge plans, specific to individual needs.  7/21/2025 1850 by Gail Hathaway LPN  Outcome: Progressing     Problem: Anxiety  Goal: Will report anxiety at manageable levels  Description: INTERVENTIONS:  1. Administer medication as ordered  2. Teach and rehearse alternative coping skills  3. Provide emotional support with 1:1 interaction with staff  7/21/2025 2356 by Netta Barraza, RN  Outcome: Progressing  Flowsheets (Taken 7/19/2025 0056)  Will report anxiety at manageable levels:   Administer medication as ordered   Provide emotional support with 1:1 interaction with staff   Teach and rehearse alternative coping skills  Note: Patient denies anxiety this shift.   7/21/2025 1850 by Gail Hathaway LPN  Outcome: Progressing  Note: Patient reports has anxiety. Pt taking medicaiton prn. Verbalized medication was  effective.     Problem: Coping  Goal: Pt/Family able to verbalize concerns and 
  Problem: Risk for Elopement  Goal: Patient will not exit the unit/facility without proper excort  Outcome: Progressing  Flowsheets (Taken 7/18/2025 1404 by Yuly De Santiago, RN)  Nursing Interventions for Elopement Risk: Reduce environmental triggers  Note: Patient has not had any elopement attempts this shift.      Problem: Discharge Planning  Goal: Discharge to home or other facility with appropriate resources  Outcome: Not Progressing  Flowsheets (Taken 7/19/2025 0056)  Discharge to home or other facility with appropriate resources: Identify barriers to discharge with patient and caregiver  Note: Patient is not able to verbalize barriers to discharge this shift.      Problem: Anxiety  Goal: Will report anxiety at manageable levels  Description: INTERVENTIONS:  1. Administer medication as ordered  2. Teach and rehearse alternative coping skills  3. Provide emotional support with 1:1 interaction with staff  Outcome: Not Progressing  Flowsheets (Taken 7/19/2025 0056)  Will report anxiety at manageable levels:   Administer medication as ordered   Provide emotional support with 1:1 interaction with staff   Teach and rehearse alternative coping skills  Note: Patient reports anxiety this shift.      Problem: Coping  Goal: Pt/Family able to verbalize concerns and demonstrate effective coping strategies  Description: INTERVENTIONS:  1. Assist patient/family to identify coping skills, available support systems and cultural and spiritual values  2. Provide emotional support, including active listening and acknowledgement of concerns of patient and caregivers  3. Reduce environmental stimuli, as able  4. Instruct patient/family in relaxation techniques, as appropriate  5. Assess for spiritual pain/suffering and initiate Spiritual Care, Psychosocial Clinical Specialist consults as needed  7/19/2025 0056 by Netta Barraza, RN  Outcome: Not Progressing  Flowsheets (Taken 7/18/2025 1536 by Lukas Carvajal 
  Problem: Risk for Elopement  Goal: Patient will not exit the unit/facility without proper excort  Outcome: Progressing  Flowsheets (Taken 7/18/2025 1404 by Yuly De Santiago, RN)  Nursing Interventions for Elopement Risk: Reduce environmental triggers  Note: Patient has not had any elopement attempts this shift.      Problem: Discharge Planning  Goal: Discharge to home or other facility with appropriate resources  Outcome: Progressing  Flowsheets (Taken 7/19/2025 0056)  Discharge to home or other facility with appropriate resources: Identify barriers to discharge with patient and caregiver  Note: Patient identifies barriers to discharge this shift.      Problem: Anxiety  Goal: Will report anxiety at manageable levels  Description: INTERVENTIONS:  1. Administer medication as ordered  2. Teach and rehearse alternative coping skills  3. Provide emotional support with 1:1 interaction with staff  Outcome: Not Progressing  Flowsheets (Taken 7/19/2025 0056)  Will report anxiety at manageable levels:   Administer medication as ordered   Provide emotional support with 1:1 interaction with staff   Teach and rehearse alternative coping skills  Note: Patient reports anxiety this shift.      Problem: Coping  Goal: Pt/Family able to verbalize concerns and demonstrate effective coping strategies  Description: INTERVENTIONS:  1. Assist patient/family to identify coping skills, available support systems and cultural and spiritual values  2. Provide emotional support, including active listening and acknowledgement of concerns of patient and caregivers  3. Reduce environmental stimuli, as able  4. Instruct patient/family in relaxation techniques, as appropriate  5. Assess for spiritual pain/suffering and initiate Spiritual Care, Psychosocial Clinical Specialist consults as needed  Outcome: Not Progressing  Flowsheets (Taken 7/18/2025 1536 by Lukas Carvajal, CTRS)  Patient/family able to verbalize anxieties, fears, and concerns, and 
  Problem: Risk for Elopement  Goal: Patient will not exit the unit/facility without proper excort  Outcome: Progressing  Note: No exit seeking this shift.      Problem: Discharge Planning  Goal: Discharge to home or other facility with appropriate resources  Outcome: Progressing  Note: Discharge planner working with patient to achieve optimal discharge plan, specific to the needs of this patient.        Problem: Coping  Goal: Pt/Family able to verbalize concerns and demonstrate effective coping strategies  Description: INTERVENTIONS:  1. Assist patient/family to identify coping skills, available support systems and cultural and spiritual values  2. Provide emotional support, including active listening and acknowledgement of concerns of patient and caregivers  3. Reduce environmental stimuli, as able  4. Instruct patient/family in relaxation techniques, as appropriate  5. Assess for spiritual pain/suffering and initiate Spiritual Care, Psychosocial Clinical Specialist consults as needed  Outcome: Progressing  Note: Patient is encouraged to attend therapeutic groups to gain insight on positive coping skills.        Problem: Decision Making  Goal: Pt/Family able to effectively weigh alternatives and participate in decision making related to treatment and care  Description: INTERVENTIONS:  1. Determine when there are differences between patient's view, family's view, and healthcare provider's view of condition  2. Facilitate patient and family articulation of goals for care  3. Help patient and family identify pros/cons of alternative solutions  4. Provide information as requested by patient/family  5. Respect patient/family right to receive or not to receive information  6. Serve as a liaison between patient and family and health care team  7. Initiate Consults from Ethics, Palliative Care or initiate Family Care Conference as is appropriate  Outcome: Progressing     Problem: Behavior  Goal: Pt/Family maintain 
thus far today. Pt has been given maximum verbal encouragement to attend group therapy sessions, pt has been isolative to his room the majority of the day. Intermittently, pt is seen out on the unit interacting with others. Plan to continue to monitor progress and encourage participation in group therapy programming.       Problem: Decision Making  Goal: Pt/Family able to effectively weigh alternatives and participate in decision making related to treatment and care  Description: INTERVENTIONS:  1. Determine when there are differences between patient's view, family's view, and healthcare provider's view of condition  2. Facilitate patient and family articulation of goals for care  3. Help patient and family identify pros/cons of alternative solutions  4. Provide information as requested by patient/family  5. Respect patient/family right to receive or not to receive information  6. Serve as a liaison between patient and family and health care team  7. Initiate Consults from Ethics, Palliative Care or initiate Family Care Conference as is appropriate  Outcome: Progressing     Problem: Behavior  Goal: Pt/Family maintain appropriate behavior and adhere to behavioral management agreement, if implemented  Description: INTERVENTIONS:  1. Assess patient/family's coping skills and  non-compliant behavior (including use of illegal substances)  2. Notify security of behavior or suspected illegal substances which indicate the need for search of the family and/or belongings  3. Encourage verbalization of thoughts and concerns in a socially appropriate manner  4. Utilize positive, consistent limit setting strategies supporting safety of patient, staff and others  5. Encourage participation in the decision making process about the behavioral management agreement  6. If a visitor's behavior poses a threat to safety call refer to organization policy.  7. Initiate consult with , Psychosocial CNS, Spiritual Care as 
appropriate  7/20/2025 2319 by Netta Barraza, RN  Outcome: Progressing  Flowsheets (Taken 7/19/2025 0056)  Patient/family able to effectively weigh alternatives and participate in decision making related to treatment and care: Determine when there are differences between patient's view, family's view, and healthcare provider's view of condition  Note: Patient is able to effectively weigh alternatives and participate in decision making related to treatment and care.   7/20/2025 1705 by Lexy Burden RN  Outcome: Progressing     Problem: Behavior  Goal: Pt/Family maintain appropriate behavior and adhere to behavioral management agreement, if implemented  Description: INTERVENTIONS:  1. Assess patient/family's coping skills and  non-compliant behavior (including use of illegal substances)  2. Notify security of behavior or suspected illegal substances which indicate the need for search of the family and/or belongings  3. Encourage verbalization of thoughts and concerns in a socially appropriate manner  4. Utilize positive, consistent limit setting strategies supporting safety of patient, staff and others  5. Encourage participation in the decision making process about the behavioral management agreement  6. If a visitor's behavior poses a threat to safety call refer to organization policy.  7. Initiate consult with , Psychosocial CNS, Spiritual Care as appropriate  7/20/2025 2319 by Netta Barraza, RN  Outcome: Progressing  Flowsheets (Taken 7/19/2025 0056)  Patient/family maintains appropriate behavior and adheres to behavioral management agreement, if implemented: Assess patient/family’s coping skills and  non-compliant behavior (including use of illegal substances)  Note: Patient maintains appropriate behavior and adheres to behavioral management.   7/20/2025 1705 by Lexy Burden RN  Outcome: Progressing     Problem: Depression/Self Harm  Goal: Effect of psychiatric condition will be 
(Taken 7/19/2025 0056 by Netta Barraza RN)  Effect of psychiatric condition will be minimized and patient will be protected from self harm: Assess impact of patient’s symptoms on level of functioning, self care needs and offer support as indicated  Note: Patient denies thoughts of self harm at this time. 15 minute safety checks in place.      Problem: Pain  Goal: Verbalizes/displays adequate comfort level or baseline comfort level  7/19/2025 0921 by Humes, Heather, RN  Outcome: Progressing  Flowsheets (Taken 7/19/2025 0056 by Netta Barraza RN)  Verbalizes/displays adequate comfort level or baseline comfort level: Encourage patient to monitor pain and request assistance  Note: Patient denies pain at his time.      Problem: Discharge Planning  Goal: Discharge to home or other facility with appropriate resources  7/19/2025 0921 by Humes, Heather, RN  Outcome: Progressing  Flowsheets (Taken 7/19/2025 0056 by Netta Barraza RN)  Discharge to home or other facility with appropriate resources: Identify barriers to discharge with patient and caregiver  Note: Discharge planning ongoing at this time.   7/19/2025 0056 by Netta Barraza RN  Outcome: Not Progressing  Flowsheets (Taken 7/19/2025 0056)  Discharge to home or other facility with appropriate resources: Identify barriers to discharge with patient and caregiver  Note: Patient is not able to verbalize barriers to discharge this shift.      Problem: Anxiety  Goal: Will report anxiety at manageable levels  Description: INTERVENTIONS:  1. Administer medication as ordered  2. Teach and rehearse alternative coping skills  3. Provide emotional support with 1:1 interaction with staff  7/19/2025 0921 by Humes, Heather, RN  Outcome: Progressing  Flowsheets (Taken 7/19/2025 0056 by Netta Barraza RN)  Will report anxiety at manageable levels:   Administer medication as ordered   Provide emotional support with 1:1 interaction with staff   Teach and rehearse

## 2025-07-22 NOTE — BH NOTE
Goal Wrap-Up/Relaxation Group    Date: 7/21/2025  Start Time: 2000  End Time:  2020    Type of Group: Goal Wrap Up and Relaxation    States that goal today was: \"no goal set today\"    Goal for today was No goal set today    Patient Did not participate in group/activities      Signature: Netta Barraza RN